# Patient Record
Sex: FEMALE | Race: WHITE | NOT HISPANIC OR LATINO | URBAN - METROPOLITAN AREA
[De-identification: names, ages, dates, MRNs, and addresses within clinical notes are randomized per-mention and may not be internally consistent; named-entity substitution may affect disease eponyms.]

---

## 2019-04-26 ENCOUNTER — TRANSFERRED RECORDS (OUTPATIENT)
Dept: HEALTH INFORMATION MANAGEMENT | Facility: CLINIC | Age: 54
End: 2019-04-26

## 2021-06-08 ENCOUNTER — TRANSFERRED RECORDS (OUTPATIENT)
Dept: HEALTH INFORMATION MANAGEMENT | Facility: CLINIC | Age: 56
End: 2021-06-08

## 2021-09-20 ENCOUNTER — TRANSFERRED RECORDS (OUTPATIENT)
Dept: HEALTH INFORMATION MANAGEMENT | Facility: CLINIC | Age: 56
End: 2021-09-20

## 2022-02-16 ENCOUNTER — TRANSFERRED RECORDS (OUTPATIENT)
Dept: HEALTH INFORMATION MANAGEMENT | Facility: CLINIC | Age: 57
End: 2022-02-16
Payer: COMMERCIAL

## 2022-03-21 ENCOUNTER — MEDICAL CORRESPONDENCE (OUTPATIENT)
Dept: HEALTH INFORMATION MANAGEMENT | Facility: CLINIC | Age: 57
End: 2022-03-21
Payer: COMMERCIAL

## 2022-03-23 ENCOUNTER — TRANSCRIBE ORDERS (OUTPATIENT)
Dept: OTHER | Age: 57
End: 2022-03-23
Payer: COMMERCIAL

## 2022-03-23 DIAGNOSIS — R42 DIZZINESS AND GIDDINESS: Primary | ICD-10-CM

## 2022-03-24 ENCOUNTER — TELEPHONE (OUTPATIENT)
Dept: OTOLARYNGOLOGY | Facility: CLINIC | Age: 57
End: 2022-03-24
Payer: COMMERCIAL

## 2022-03-24 NOTE — TELEPHONE ENCOUNTER
1. Have you noticed any changes in hearing? Yes  2. Do you have ringing, buzzing, or other sounds in your ears or head, this is also referred to as Tinnitus? Yes  3. When and where was your last hearing test? 6/8/21 [facility] in [facility]  4. Do you feel lightheaded or foggy? Yes  5. Do you have a spinning sensation? No  6. Is there any specific position that can bring on dizziness? Reading something close to eyes   7. Does looking up cause dizziness? Sometimes: it used to   8. Does getting in and our of bed cause dizziness? Yes  9. Does turning over in bed increase or cause dizziness? No  10. Does bending over cause dizziness? Yes  11. Is there anything that you can do to prevent the dizziness? Move differently   12. Has the dizziness gotten better with time? No  13. Have you seen Physical Therapy for dizziness? (Please indicate clinic and as much of the location as possible): Yes, If yes, where?  if yes, who?   14. Are you being referred to a specific physician? No  15. Have you been evaluated/treated for your dizziness at any other location?  (If yes,obtian as much clinic/provider/locaiton as possible) Yes. (If yes answer the following questions:)   Have you seen any ENT, Neurology, or other providers for these symptoms?             Yes, If yes, where? Klaus Argueta @ [facility]. [facility]. [facility]  if yes, who?    Have you had any balance or Audiology testing? Yes, If yes, where? [facility] if yes, who?  Have you had an MRI or CT scan of your head or neck? Yes, If yes, where? [facility] if yes, who?     Would you like to receive your Release of Information by mail or e-mail?  mail

## 2022-03-28 NOTE — TELEPHONE ENCOUNTER
FUTURE VISIT INFORMATION      FUTURE VISIT INFORMATION:    Date: 22    Time: 2PM    Location: [facility]  REFERRAL INFORMATION:    Referring provider: Klaus Argueta     Referring providers clinic:  [facility]    Reason for visit/diagnosis  Vertigo- Referred by  Klaus Argueta @ [facility]    RECORDS REQUESTED FROM:       Clinic name Comments Records Status Imaging Status   [facility] 22, 21, 21 note from Dr Argueta Scanned in [facility] OCCUPATIONAL THERAPY [address]    21 - 21 note from Roxanna Higgins, OTR/L  Care everywhere     [facility] PHYSICAL THERAPY [address]    21 - 21 notes from Ankur Bah, PT   Care everywhere     [facility]    [address] [place] [place] [address]    [phone]        MRN: [id number] 21 complete audiologic evaluation and videonystagmography       *DAYNA TRACKIN   Audio scanned in Epic     Mailed DAYNA 3/30/22    req 4/15/22 - received 22        [facility]   Attn: Medical Records Department  [address]  [place] [address]  Phone: [phone]   Fax: [phone] 19 MR Brain     *trackin     req 22 - received 22 req 22, 22 - PACS           3/30/22 9:46AM mailed out DAYNA to patient for [place] recs - Am[facility]   22 4:30PM sent a req to [facility] for images and reports - Amay   4/15/22 7:34AM Sent a fax to [facility] for recs - Amay   22 12:41PM reiceved imaging report from [facility], sent to scan. [facility] recs received, but no raw data. Called [facility] recs, rep transferred me to Riana (rep who processed recs) and left a voicemail about raw data. - Amay   22 9AM called Riana back at [facility], they only have the audio, she will fax the audio over. Asked that I call the clinic for VNG raw data, called clinic directly and left a message with the audiologist to provide fax info so that I can send the  signed DAYNA and fax over for the VNG - Amay   4/21/22 1:47PM received recs from [facility], still waiting for images from [facility]. SEnt a delayed message to audiology to review - Amay 5/17/22 12PM sent a 2nd fax for images - Amay 5/24/22 2:19PM images received in PACS - Amay

## 2022-04-12 NOTE — TELEPHONE ENCOUNTER
Left patient a voicemail to notify that I have not heard back from her about the DAYNA mailed out to her on 3/30 via [employer], tracked package it arrived to patient's address on 3/31. Left my direct number for call back [phone] (ok to leave detailed voiceamil). Process is delayed due to signed DAYNA, needing the DAYNA for her [facility] records.     [facility]   Clinic      4:35PM :  Patient called back stating she did received it the DAYNA but on the fence about sending it back. She will let me know if she wants to send it back.     [facility]   Clinic

## 2022-04-13 NOTE — TELEPHONE ENCOUNTER
Called and spoke with patient about DAYNA . She was under the impression from who she spoke with previously that she was seeing the ENT first and then making multiple trips to [place] for several testing. Because she lives in [place], she was very hesitant with the process and was on the fence about canceling the appointment. Spent 20 mins with patient explaining the process with her. Her records will need to be reviewed first before we recommend any testing prior to seeing Dr Nissen, but I will relay to the team about her traveling concerns after I receive all her records for review. Patient will sign the DAYNA today and either fax or email it over in the next couple of days. Patient has my direct number and email if she has any further questions.     [facility]   Clinic

## 2022-05-10 DIAGNOSIS — R42 DIZZINESS: Primary | ICD-10-CM

## 2022-05-10 NOTE — TELEPHONE ENCOUNTER
Requesting orders for hearing test, VNG/rotary chair and VEMP testing prior to patient's ENT appointment with Rick Nissen, M.D.      Chart Review: Per audiology notes from [facility]: Patient has a longstanding history of dizziness for 15+ years. Symptoms had lessened and she was managing fairly well until luis Covid in March 2021. Symptoms worsened after that and she was experiencing daily dizziness and vertigo, especially after reading or looking at a computer screen for extended periods of time. She had to take leave from her job due to this.     [facility]:  Hearing eval 6/8/21: Normal hearing with the exception of mild likely SNHL a 6 kHz in the right ear.   VNG 6/8/21: Bithermal water calorics WNL. Abnormal Pursuit and OPK testing. Vestibular rehab was recommended.     Patient went through vestibular PT 6/2021-9/2021. Notes indicate she did have some improvement in symptoms and was able to stop taking meclizine, however still having dizziness with computer screens and reading, and with head and eye movement.     [facility]: First saw neurology in 2015, and patient had 10+ years of chronic disequilibrium. No abnormal findings on workups. Was placed on valium at bedtime, and was doing well with that for years until luis Covid.  PPPD was a possible diagnosis, and sertraline was initiated, with no improvement in symptoms. Question of vestibular migraine, as years ago when it started she would have headaches with the dizziness.     MRI 2019: Normal    Delvis Balderas  Licensed Audiologist  [place] # [id number]

## 2022-05-19 ENCOUNTER — TELEPHONE (OUTPATIENT)
Dept: AUDIOLOGY | Facility: CLINIC | Age: 57
End: 2022-05-19
Payer: COMMERCIAL

## 2022-05-19 NOTE — TELEPHONE ENCOUNTER
Writer called to schedule WIN, VNG/CHAIr and VEMP prior to ENT visit. Patient was still under the impression this would be done closer to appt. Isaac stated she has to speak with spouse about balance testing dates and will get back to writer      Writer scheduled testing to make sure they are on hold

## 2022-07-13 ENCOUNTER — TELEPHONE (OUTPATIENT)
Dept: AUDIOLOGY | Facility: CLINIC | Age: 57
End: 2022-07-13

## 2022-07-13 NOTE — TELEPHONE ENCOUNTER
"\"I m calling from the Audiology and Balance Testing department at the . This is just a call to remind you of your upcoming Balance Testing appointment on [Date], and to see if you have any questions or concerns regarding the balance testing you'll be doing. You should have received an itinerary via mail or via [employer], if you are active, that goes over what to expect and explains the dos and don ts both 48 hours before, and the day of. There is a list of medications for you to review on the itinerary that we would like you to stop taking beforehand. If you didn t receive the itinerary or you still have questions, please give our clinic a call at [phone]. Otherwise, we will see you on [Date] starting at [Time].\"    Please send encounter if patient would like to reschedule.  "

## 2022-07-20 NOTE — PROGRESS NOTES
"AUDIOLOGY REPORT-BALANCE ASSESSMENT    SUBJECTIVE: Nadja Morocho, 57 year old, was seen in Audiology at the [facility] on 7/21/2022, for videonystagmography (VNG) and rotational chair testing referred by Rick Nissen, M.D.     Patient presents with chief complaints of dizziness, disequilibrium and unsteadiness. Patient reports symptoms onset approximately 15+ years ago. Patient describes her symptoms as constant but varying in severity. Symptoms are exacerbated by fast head or body movements such as walking, looking down/up, bending down/up (doing laundry, putting away dishes, etc), and turning side to side. Patient clarifies that she is typically all right during most movements but will feel increased symptoms following their cessation. Patient also reports watching TV or using a computer increases her symptoms. Patient reports that times of heightened symptoms will last hours in duration. Patient reports that she will often experience nausea and occasionally double vision with increased symptoms as well. Patient reports that she has experienced a spinning sensation a couple times during times of heightened symptoms. Patient also describes her symptoms as varying across days with some days being \"worse\" and some days being \"better\" days. She reports that throughout a given week, half of her days will be \"better days\" and half of her days will be \"worse days\". Patient reports noticing that eating very salty or very spicy foods also tends to make her feel worse. Patient reports sometimes veering to either side while walking but denies favoring one side. Patient reports history of one fall approximately 2-3 years ago. She describes the incident as taking a misstep and falling resulting in her hurting her knee. Patient reports that she is concerned about future falls during times of increased symptoms.    Patient's most recent hearing evaluation performed at an outside " clinic on 6/8/2021 revealed normal hearing with the exception of a mild likely sensorineural hearing loss at 6000 Hz in the right ear. Patient denies concerns or fluctuations in her hearing. She reports bilateral constant tinnitus which can vary in loudness. Patient denies otalgia, aural fullness, otorrhea and previous history of ear surgeries.    Patient denies headaches or migraines. She reports sometimes experiencing sensitivity to bright lights. She denies sensitivity to loud sounds or dizziness being provoked by loud sounds. Patient denies motion intolerance. She denies history of head injuries, concussions, cancer or chemotherapy. Patient denies dizziness being provoked by pressure changes (coughing, sneezing, blowing her nose, lifting heavy items or bearing down). Patient denies autophony (eye blinks). Patient denies sensation of persistent motion post cessation of motion. Patient reports sometimes experiencing motion while still (rocking or swaying) if she is sitting in water (e.g. sitting in the bathtub). Patient denies blurred vision and history of previous eye surgeries. Family history is positive for dizziness (father) and negative for hearing loss, migraines and vertigo. Patient denies consumption of caffeinated beverages, nicotine, alcoholic substances, or use of medications with known vestibular interactions within the past 48 hours.    OBJECTIVE:  Abuse Screening:  Do you feel unsafe at home or work/school? No  Do you feel threatened by someone? No  Does anyone try to keep you from having contact with others, or doing things outside of your home? No  Physical signs of abuse present? No    Rotational chair testing:  Sinusoidal harmonic acceleration test: 0.01, 0.02, 0.04, 0.08, 0.16, 0.32 and 0.64 Hz.   Spontaneous nystagmus: possible slight intermittent left beats  Gain: Normal  Phase: Mild borderline abnormal low frequency phase lead (at 0.01 Hz only) sloping to normal phase lead rising to mild  abnormal high frequency phase lead (at 0.64 Hz only).   Symmetry: Normal  Spectral Purity: Good  Overall rotational chair test: Abnormal.     Videonystagmography (VNG) testing:  Prescreening:  Tympanograms: Normal eardrum mobility bilaterally. Note: this test is completed to determine the status of the middle ear before irrigations are completed. *Patient denies dizziness with tympanometry.   Ocular range of motion and ocular counter roll: Normal  Cross/cover: Normal  Head Thrust: Negative     Nystagmus Tests:  Gaze-Horizontal with Fixation:   Center: Normal   Right: Normal   Left: Normal  Gaze-Vertical with Fixation:   Up: Normal   Down: Normal  Gaze with Fixation Denied  *Patient had difficulty minimizing eyeblinks throughout.    Center: No consistent nystagmus.    Right: Slight 1 degree/s right beats, likely endpoint\nonsignificant.   Left: 2 degrees/s left beating nystagmus, likely endpoint\nonsignificant.   Up: No consistent nystagmus.  High Frequency Headshake:   Horizontal: Negative. 2 degrees/s left beating nystagmus, no change in symptoms.   Vertical: Negative. 1-2 degrees/s left beating nystagmus, no change in symptoms.    Ayer-Hallpike Head Right: Negative for PC BPPV. No nystagmus or change in  symptoms.  Ayer-Hallpike Head Left: Negative for PC BPPV. No consistent nystagmus, no change in symptoms in supine.  Seated: Patient reports mild increased dizziness upon rising to seated position, no nystagmus.  Roll Test Head Right: Negative for HC BPPV.  No nystagmus or change in symptoms.  Roll Test Head Left: Negative for HC BPPV.  No nystagmus or change in symptoms.    Positional Testing:  Positionals: Supine: Slight intermittent 1 degree/s left beating nystagmus, unable to suppress with fixation, no change in symptoms.  Positionals: Body Right: 1 degree/s left beating nystagmus, unable to suppress with fixation, no change in symptoms.  Positionals: Body Left: Intermittent 1 degree/s left beats mixed with eye  blinks, unable to suppress with fixation, no change in symptoms.  Positionals: Pre-Caloric: Slight 1 degree/s left beating nystagmus, unable to suppress with fixation, no change in symptoms.    Oculomotor Tests:  Saccades: Normal  Anti-saccades: Normal, patient can complete task.   Pursuit (repeatable): Borderline abnormal     Calorics:  (Tested at 44 degrees and 30 degrees Celsius for 30 seconds for warm and cool water, respectively):  Right Warm Eye Speed: 8 degrees per second right beating  Left Warm Eye Speed: 13 degrees per second left beating  Right Cool Eye Speed: 9 degrees per second left beating  Left Cool Eye Speed: 12 degrees per second right beating  Difference between ear: 19% right hypofunction. (Greater than 25% considered clinically significant.)  Fixation Index: 0.08 Normal  Overall caloric test: Normal    Post Irrigations Otoscopy: Normal    ASSESSMENT:  1. Indications of mild central vestibular system involvement noted on today's exam were as follows:     -Borderline abnormal Pursuit (repeatable).    -Slight spontaneous left beating nystagmus, typically present with and without fixation, evident throughout much of testing.    -Rotary Chair (mild abnormal high frequency phase lead at 0.64 Hz only); suggests possible mild central influences.      2. Indications of possible peripheral vestibular system involvement noted on today's exam were as follows:     -Rotary Chair (mild borderline abnormal low frequency phase lead at 0.01 Hz with other parameters normal); suggests possible mild previous peripheral lesion which has physiologically compensated.      PLAN:  Proceed with vestibular evoked myogenic potential (VEMP) testing as scheduled. Follow-up with Dr. Rick Nissen regarding today's results and for medical management. Please call this clinic at [phone] with questions regarding these results or recommendations.     Vicki Love M.A.   Audiology Doctoral Student   [place] #[id number]      I was  present with the patient for the entire Audiology appointment including all procedures/testing performed by the AuD student, and agree with the assessment and plan as documented.    Mere Muniz. CCC-A  Licensed Audiologist   [place] #[id number]

## 2022-07-21 ENCOUNTER — TRANSFERRED RECORDS (OUTPATIENT)
Dept: HEALTH INFORMATION MANAGEMENT | Facility: CLINIC | Age: 57
End: 2022-07-21

## 2022-07-21 ENCOUNTER — OFFICE VISIT (OUTPATIENT)
Dept: AUDIOLOGY | Facility: CLINIC | Age: 57
End: 2022-07-21
Payer: COMMERCIAL

## 2022-07-21 ENCOUNTER — OFFICE VISIT (OUTPATIENT)
Dept: AUDIOLOGY | Facility: CLINIC | Age: 57
End: 2022-07-21

## 2022-07-21 DIAGNOSIS — R42 DISEQUILIBRIUM: Primary | ICD-10-CM

## 2022-07-21 DIAGNOSIS — Z01.10 ENCOUNTER FOR HEARING TEST: Primary | ICD-10-CM

## 2022-07-21 DIAGNOSIS — R26.81 UNSTEADINESS: ICD-10-CM

## 2022-07-21 DIAGNOSIS — R42 DIZZINESS AND GIDDINESS: ICD-10-CM

## 2022-07-21 DIAGNOSIS — R42 DIZZINESS AND GIDDINESS: Primary | ICD-10-CM

## 2022-07-21 PROCEDURE — 92567 TYMPANOMETRY: CPT | Performed by: AUDIOLOGIST

## 2022-07-21 PROCEDURE — 92546 SINUSOIDAL ROTATIONAL TEST: CPT | Performed by: AUDIOLOGIST

## 2022-07-21 PROCEDURE — 92519 VEMP TST I&R CERVICAL&OCULAR: CPT | Performed by: AUDIOLOGIST

## 2022-07-21 PROCEDURE — 92545 OSCILLATING TRACKING TEST: CPT | Mod: 59 | Performed by: AUDIOLOGIST

## 2022-07-21 PROCEDURE — 92537 CALORIC VSTBLR TEST W/REC: CPT | Performed by: AUDIOLOGIST

## 2022-07-21 PROCEDURE — 92542 POSITIONAL NYSTAGMUS TEST: CPT | Mod: 59 | Performed by: AUDIOLOGIST

## 2022-07-21 PROCEDURE — 92541 SPONTANEOUS NYSTAGMUS TEST: CPT | Performed by: AUDIOLOGIST

## 2022-07-21 NOTE — PROGRESS NOTES
"AUDIOLOGY REPORT    SUBJECTIVE: Nadja Morocho was seen in the Audiology Clinic at the [facility] on 7/21/2022 for a vestibular evoked myogenic potential (VEMP) evaluation, referred by Dr Rick Nissen, MD.. The following case history was obtained by Ayana Cross during the patient's balance assessment at this facility earlier today:    Patient presents with chief complaints of dizziness, disequilibrium and unsteadiness. Patient reports symptoms onset approximately 15+ years ago. Patient describes her symptoms as constant but varying in severity. Symptoms are exacerbated by fast head or body movements such as walking, looking down/up, bending down/up (doing laundry, putting away dishes, etc), and turning side to side. Patient clarifies that she is typically all right during most movements but will feel increased symptoms following their cessation. Patient also reports watching TV or using a computer increases her symptoms. Patient reports that times of heightened symptoms will last hours in duration. Patient reports that she will often experience nausea and occasionally double vision with increased symptoms as well. Patient reports that she has experienced a spinning sensation a couple times during times of heightened symptoms. Patient also describes her symptoms as varying across days with some days being \"worse\" and some days being \"better\" days. She reports that throughout a given week, half of her days will be \"better days\" and half of her days will be \"worse days\". Patient reports noticing that eating very salty or very spicy foods also tends to make her feel worse. Patient reports sometimes veering to either side while walking but denies favoring one side. Patient reports history of one fall approximately 2-3 years ago. She describes the incident as taking a misstep and falling resulting in her hurting her knee. Patient reports that she is concerned " about future falls during times of increased symptoms.     Patient's most recent hearing evaluation performed at an outside clinic on 6/8/2021 revealed normal hearing with the exception of a mild likely sensorineural hearing loss at 6000 Hz in the right ear. Patient denies concerns or fluctuations in her hearing. She reports bilateral constant tinnitus which can vary in loudness. Patient denies otalgia, aural fullness, otorrhea and previous history of ear surgeries.     Patient denies headaches or migraines. She reports sometimes experiencing sensitivity to bright lights. She denies sensitivity to loud sounds or dizziness being provoked by loud sounds. Patient denies motion intolerance. She denies history of head injuries, concussions, cancer or chemotherapy. Patient denies dizziness being provoked by pressure changes (coughing, sneezing, blowing her nose, lifting heavy items or bearing down). Patient denies autophony (eye blinks). Patient denies sensation of persistent motion post cessation of motion. Patient reports sometimes experiencing motion while still (rocking or swaying) if she is sitting in water (e.g. sitting in the bathtub). Patient denies blurred vision and history of previous eye surgeries. Family history is positive for dizziness (father) and negative for hearing loss, migraines and vertigo.    OBJECTIVE:   VEMP testing is performed using an auditory evoked potentials system. Surface electrodes are placed in several locations on the patient's head and neck in order to record muscle motoneuron activity in response to loud auditory stimuli. This testing assesses very specific vestibular pathways in the inner ear and central nervous system. cVEMP testing is performed with electrodes placed on the patient's sternocleidomastoid muscle, and is used to assess the saccular organ, afferent inferior vestibular nerve and vestibular nuclei within the brainstem. oVEMP testing is performed with electrodes placed  under the patient's eyes, and is used to assess the utricle and afferent superior vestibular nerve and nuclei within the brainstem.  Patients that may benefit from this procedure are those with complaints of vertigo and imbalance or those suspected of superior canal dehiscence. A total of 90 minutes was spent completing the VEMP testing today.    Tympanograms (assessed/billed during patient's balance assessment earlier today):     RIGHT: normal eardrum mobility      LEFT: normal eardrum mobility    cVEMP Thresholds via 500 Hz toneburst:    RIGHT: 85 dB nHL which  suggest normal saccular and inferior vestibular nerve function    LEFT: 90 dB nHL which  suggest normal saccular and inferior vestibular nerve function    AMPLITUDE ASYMMETRY: 18% with the left response more robust (greater than 33% is considered abnormal)    oVEMP Thresholds via 500 Hz toneburst:     RIGHT: 90 dB nHL which suggests normal utricle and superior vestibular nerve function    LEFT: 90 dB nHL which suggests normal utricle and superior vestibular nerve function    AMPLITUDE ASYMMETRY: 1% with the right response more robust (greater than 33% is considered abnormal)    ASSESSMENT: Today's results suggest a normal VEMP evaluation bilaterally. These results suggest normal saccular and inferior vestibular nerve function and normal utricle and superior vestibular nerve function bilaterally.      PLAN:The patient will follow up with Dr Nissen for medical management.     Please call this clinic with questions regarding these results or recommendations.      Mere Brooks.  Licensed Audiologist  [place] #[id number]

## 2022-07-26 ENCOUNTER — PRE VISIT (OUTPATIENT)
Dept: OTOLARYNGOLOGY | Facility: CLINIC | Age: 57
End: 2022-07-26

## 2022-07-26 ENCOUNTER — OFFICE VISIT (OUTPATIENT)
Dept: OTOLARYNGOLOGY | Facility: CLINIC | Age: 57
End: 2022-07-26
Payer: COMMERCIAL

## 2022-07-26 ENCOUNTER — OFFICE VISIT (OUTPATIENT)
Dept: AUDIOLOGY | Facility: CLINIC | Age: 57
End: 2022-07-26
Attending: OTOLARYNGOLOGY

## 2022-07-26 VITALS
WEIGHT: 150 LBS | DIASTOLIC BLOOD PRESSURE: 80 MMHG | SYSTOLIC BLOOD PRESSURE: 127 MMHG | TEMPERATURE: 97.3 F | HEART RATE: 84 BPM | HEIGHT: 66 IN | BODY MASS INDEX: 24.11 KG/M2

## 2022-07-26 DIAGNOSIS — Z01.10 ENCOUNTER FOR HEARING TEST: ICD-10-CM

## 2022-07-26 DIAGNOSIS — H61.23 EXCESSIVE CERUMEN IN BOTH EAR CANALS: ICD-10-CM

## 2022-07-26 DIAGNOSIS — H93.13 TINNITUS, BILATERAL: ICD-10-CM

## 2022-07-26 DIAGNOSIS — R42 DIZZINESS AND GIDDINESS: ICD-10-CM

## 2022-07-26 DIAGNOSIS — R42 DIZZINESS AND GIDDINESS: Primary | ICD-10-CM

## 2022-07-26 DIAGNOSIS — R42 DIZZINESS: Primary | ICD-10-CM

## 2022-07-26 PROCEDURE — 92557 COMPREHENSIVE HEARING TEST: CPT | Performed by: AUDIOLOGIST-HEARING AID FITTER

## 2022-07-26 PROCEDURE — 92550 TYMPANOMETRY & REFLEX THRESH: CPT | Performed by: AUDIOLOGIST-HEARING AID FITTER

## 2022-07-26 PROCEDURE — 92504 EAR MICROSCOPY EXAMINATION: CPT | Performed by: OTOLARYNGOLOGY

## 2022-07-26 PROCEDURE — 99204 OFFICE O/P NEW MOD 45 MIN: CPT | Mod: 25 | Performed by: OTOLARYNGOLOGY

## 2022-07-26 ASSESSMENT — PAIN SCALES - GENERAL: PAINLEVEL: NO PAIN (0)

## 2022-07-26 NOTE — LETTER
7/26/2022       RE: Nadja Morocho  [address] [place] [id number]     Dear Colleague,    Thank you for referring your patient, Nadja Morocho, to the [facility] [place] [facility]. Please see a copy of my visit note below.    Dear  No primary care provider on file.:    I had the pleasure of meeting Nadja Morocho in consultation today at the [facility] at your request.    CHIEF COMPLAINT: Dizziness    HISTORY OF PRESENT ILLNESS: Patient is a 57-year-old in today for consultation on dizziness from her local physician.  She is coming today with her .  She has been having balance issues for over 10 years.  She was worked up years ago with no findings, was followed by neurologist.  She was finally put on 5 mg of Valium at bedtime and she did much better and quite well with that for over 10 years.  She contracted COVID last March and her balance symptoms worsened.  Since the COVID diagnosis, she is having problems whenever she is reading or using the computer, subsequently gets up she is very imbalanced and struggles to walk or even get around.  She denies any actual vertigo.  Occasionally has had nausea but has never vomited.  This has been pretty persistent now for about a year and a half.  From an ear standpoint, she does not notice any hearing loss or fluctuation.  She does have bilateral constant tinnitus that she has had for some time.  She denies any pain or drainage in your ears.  Further denies any dysphagia, hoarseness, facial paresthesias.  She has been followed by a neurologist in [place] and they have tried migraine treatment and other medications with no help.  She is now down for assessment from an inner ear or vestibular standpoint.  MRI scan in 2019 was normal.  He did undergo physical therapy a year ago in June with some mild improvement.  Vestibular testing  year and a half ago [place] was essentially negative.    ALLERGIES:    Allergies   Allergen Reactions     Hydrocodone Rash     Other reaction(s): Eruption (Unknown)       HABITS: Social History    Substance and Sexual Activity      Alcohol use: Not on file     History   Smoking Status     Former Smoker     Packs/day: 0.50     Years: 3.00   Smokeless Tobacco     Never Used         PAST MEDICAL HISTORY: Please see today's intake form (for the remainder of the PMH) which I reviewed and signed.  History reviewed. No pertinent past medical history.    FAMILY HISTORY/SOCIAL HISTORY: History reviewed. No pertinent family history.   Social History     Socioeconomic History     Marital status:      Spouse name: Not on file     Number of children: Not on file     Years of education: Not on file     Highest education level: Not on file   Occupational History     Not on file   Tobacco Use     Smoking status: Former Smoker     Packs/day: 0.50     Years: 3.00     Pack years: 1.50     Smokeless tobacco: Never Used   Substance and Sexual Activity     Alcohol use: Not on file     Drug use: Not on file     Sexual activity: Not on file   Other Topics Concern     Not on file   Social History Narrative     Not on file     Social Determinants of Health     Financial Resource Strain: Not on file   Food Insecurity: Not on file   Transportation Needs: Not on file   Physical Activity: Not on file   Stress: Not on file   Social Connections: Not on file   Intimate Partner Violence: Not on file   Housing Stability: Not on file       REVIEW OF SYSTEMS: Patient Supplied Answers to Review of Systems   ENT ROS 7/26/2022   Neurology: Dizzy spells   Ears, Nose, Throat: Ringing/noise in ears            The remainder of the 10 point ROS is negative    PHYSICIAL EXAMINATION:  Constitutional: The patient was well-groomed and in no acute distress.   Skin: Warm and pink.  Psychiatric: The patient's affect was calm, cooperative, and  appropriate.   Respiratory: Breathing comfortably without stridor or exertion of accessory muscles.  Eyes: Pupils were equal and reactive. Extraocular movement intact.   Head: Normocephalic and atraumatic. No lesions or scars.  Ears: Patient placed under the microscope for microscopic evaluation and cleaning of cerumen which was obscuring full visualization and complete assessment of both TMs. Under high power magnification, the right ear was examined and cleaned of cerumen using curet, alligator forceps, and suction.  After cleaning, TM is fully visualized and has normal position with normal middle ear aeration. The left ear was then cleaned and inspected using microscope, instruments and similar techniques. After cleaning of cerumen, the TM has normal position with normal aeration to middle ear.  Nose: Sinuses were nontender. Anterior rhinoscopy revealed midline septum and absence of purulence or polyps.  Oral Cavity: Normal tongue, floor of mouth, buccal mucosa, and palate. No lesions or masses on inspection or palpation. No abnormal lymph tissue in the oropharynx.   Neck: The parotid is soft without masses. Supple with normal laryngeal and tracheal landmarks.   Lymphatic: There is no palpable lymphadenopathy or other masses in the neck.   Neurologic: Alert and oriented x 3. Cranial nerves III-XI within normal limits. Voice quality normal.  Cerebellar Function Tests:  Grossly normal    Audiogram: Audiometric testing shows normal hearing in both ears through all frequencies.  She has excellent discrimination at 100% on the right and 96% on the left.  She has normal type A tympanograms bilaterally.  Tuning forks are all normal.    Vestibular testing: Caloric testing again normal with no unilateral weakness seen or noted.  Rotational chair suggest possible mild previous peripheral lesion which she has physiologically compensated for.      IMPRESSION AND PLAN:   1. Dizziness: Discussed this with her  and  herself in detail today.  Her exam today from an ear standpoint was completely normal as well as audiometric testing and vestibular testing from an otologic standpoint.  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  She indicates that her symptoms are somewhat like that.  We discussed this syndrome in detail and unfortunately we do not have either an etiologic answer or treatment for that either.  In any event discussed with her that from an ear standpoint everything completely normal today.  Fortunately she is going to return to her neurologist in [place] for continued follow-up and treatment.  2. Bilateral tinnitus: Not problematic, no treatment needed, monitor.  3. Excessive cerumen: Cleaned today, no further treatment needed, monitor.    Thank you very much for the opportunity to participate in the care of your patient.    Rick L Nissen MD

## 2022-07-26 NOTE — PATIENT INSTRUCTIONS
1. You were seen in the ENT Clinic today by Dr. Nissen.  If you have any questions or concerns after your appointment, please call   - Option 1: ENT Clinic: [phone]   - Option 2: Nadja (Dr. Nissen's Nurse): [phone]                   Thalia(Dr. Nissen's Nurse): [phone]      2.   Plan to return to clinic as needed    Nadja Guaman LPN  [facility] - Otolaryngology

## 2022-07-26 NOTE — NURSING NOTE
"Chief Complaint   Patient presents with     Consult     Vertigo      Blood pressure 127/80, pulse 84, temperature 97.3  F (36.3  C), height 1.676 m (5' 6\"), weight 68 kg (150 lb).    .Camron Tillman LPN    "

## 2022-07-26 NOTE — PROGRESS NOTES
AUDIOLOGY REPORT    SUMMARY: Audiology visit completed. See audiogram for results.      RECOMMENDATIONS: Follow-up with ENT.      Delvis Garcia, CCC-A, [facility]  Licensed Audiologist  [place] #[id number]

## 2022-07-26 NOTE — PROGRESS NOTES
Dear Dr. Tong primary care provider on file.:    I had the pleasure of meeting Ndaja Morocho in consultation today at the [facility] at your request.    CHIEF COMPLAINT: Dizziness    HISTORY OF PRESENT ILLNESS: Patient is a 57-year-old in today for consultation on dizziness from her local physician.  She is coming today with her .  She has been having balance issues for over 10 years.  She was worked up years ago with no findings, was followed by neurologist.  She was finally put on 5 mg of Valium at bedtime and she did much better and quite well with that for over 10 years.  She contracted COVID last March and her balance symptoms worsened.  Since the COVID diagnosis, she is having problems whenever she is reading or using the computer, subsequently gets up she is very imbalanced and struggles to walk or even get around.  She denies any actual vertigo.  Occasionally has had nausea but has never vomited.  This has been pretty persistent now for about a year and a half.  From an ear standpoint, she does not notice any hearing loss or fluctuation.  She does have bilateral constant tinnitus that she has had for some time.  She denies any pain or drainage in your ears.  Further denies any dysphagia, hoarseness, facial paresthesias.  She has been followed by a neurologist in [place] and they have tried migraine treatment and other medications with no help.  She is now down for assessment from an inner ear or vestibular standpoint.  MRI scan in 2019 was normal.  He did undergo physical therapy a year ago in June with some mild improvement.  Vestibular testing year and a half ago [place] was essentially negative.    ALLERGIES:    Allergies   Allergen Reactions     Hydrocodone Rash     Other reaction(s): Eruption (Unknown)       HABITS: Social History    Substance and Sexual Activity      Alcohol use: Not on file     History   Smoking Status     Former Smoker     Packs/day:  0.50     Years: 3.00   Smokeless Tobacco     Never Used         PAST MEDICAL HISTORY: Please see today's intake form (for the remainder of the PMH) which I reviewed and signed.  History reviewed. No pertinent past medical history.    FAMILY HISTORY/SOCIAL HISTORY: History reviewed. No pertinent family history.   Social History     Socioeconomic History     Marital status:      Spouse name: Not on file     Number of children: Not on file     Years of education: Not on file     Highest education level: Not on file   Occupational History     Not on file   Tobacco Use     Smoking status: Former Smoker     Packs/day: 0.50     Years: 3.00     Pack years: 1.50     Smokeless tobacco: Never Used   Substance and Sexual Activity     Alcohol use: Not on file     Drug use: Not on file     Sexual activity: Not on file   Other Topics Concern     Not on file   Social History Narrative     Not on file     Social Determinants of Health     Financial Resource Strain: Not on file   Food Insecurity: Not on file   Transportation Needs: Not on file   Physical Activity: Not on file   Stress: Not on file   Social Connections: Not on file   Intimate Partner Violence: Not on file   Housing Stability: Not on file       REVIEW OF SYSTEMS: Patient Supplied Answers to Review of Systems   ENT ROS 7/26/2022   Neurology: Dizzy spells   Ears, Nose, Throat: Ringing/noise in ears            The remainder of the 10 point ROS is negative    PHYSICIAL EXAMINATION:  Constitutional: The patient was well-groomed and in no acute distress.   Skin: Warm and pink.  Psychiatric: The patient's affect was calm, cooperative, and appropriate.   Respiratory: Breathing comfortably without stridor or exertion of accessory muscles.  Eyes: Pupils were equal and reactive. Extraocular movement intact.   Head: Normocephalic and atraumatic. No lesions or scars.  Ears: Patient placed under the microscope for microscopic evaluation and cleaning of cerumen which was  obscuring full visualization and complete assessment of both TMs. Under high power magnification, the right ear was examined and cleaned of cerumen using curet, alligator forceps, and suction.  After cleaning, TM is fully visualized and has normal position with normal middle ear aeration. The left ear was then cleaned and inspected using microscope, instruments and similar techniques. After cleaning of cerumen, the TM has normal position with normal aeration to middle ear.  Nose: Sinuses were nontender. Anterior rhinoscopy revealed midline septum and absence of purulence or polyps.  Oral Cavity: Normal tongue, floor of mouth, buccal mucosa, and palate. No lesions or masses on inspection or palpation. No abnormal lymph tissue in the oropharynx.   Neck: The parotid is soft without masses. Supple with normal laryngeal and tracheal landmarks.   Lymphatic: There is no palpable lymphadenopathy or other masses in the neck.   Neurologic: Alert and oriented x 3. Cranial nerves III-XI within normal limits. Voice quality normal.  Cerebellar Function Tests:  Grossly normal    Audiogram: Audiometric testing shows normal hearing in both ears through all frequencies.  She has excellent discrimination at 100% on the right and 96% on the left.  She has normal type A tympanograms bilaterally.  Tuning forks are all normal.    Vestibular testing: Caloric testing again normal with no unilateral weakness seen or noted.  Rotational chair suggest possible mild previous peripheral lesion which she has physiologically compensated for.      IMPRESSION AND PLAN:   1. Dizziness: Discussed this with her  and herself in detail today.  Her exam today from an ear standpoint was completely normal as well as audiometric testing and vestibular testing from an otologic standpoint.  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  She indicates that her symptoms are somewhat  like that.  We discussed this syndrome in detail and unfortunately we do not have either an etiologic answer or treatment for that either.  In any event discussed with her that from an ear standpoint everything completely normal today.  Fortunately she is going to return to her neurologist in [place] for continued follow-up and treatment.  2. Bilateral tinnitus: Not problematic, no treatment needed, monitor.  3. Excessive cerumen: Cleaned today, no further treatment needed, monitor.    Thank you very much for the opportunity to participate in the care of your patient.    Rick L Nissen MD